# Patient Record
Sex: FEMALE | Race: BLACK OR AFRICAN AMERICAN | NOT HISPANIC OR LATINO | URBAN - METROPOLITAN AREA
[De-identification: names, ages, dates, MRNs, and addresses within clinical notes are randomized per-mention and may not be internally consistent; named-entity substitution may affect disease eponyms.]

---

## 2024-08-23 ENCOUNTER — EMERGENCY (EMERGENCY)
Facility: HOSPITAL | Age: 29
LOS: 0 days | Discharge: HOME | End: 2024-08-23
Attending: STUDENT IN AN ORGANIZED HEALTH CARE EDUCATION/TRAINING PROGRAM
Payer: SELF-PAY

## 2024-08-23 VITALS
TEMPERATURE: 98 F | HEIGHT: 70 IN | WEIGHT: 210.1 LBS | HEART RATE: 97 BPM | SYSTOLIC BLOOD PRESSURE: 119 MMHG | OXYGEN SATURATION: 100 % | RESPIRATION RATE: 18 BRPM | DIASTOLIC BLOOD PRESSURE: 86 MMHG

## 2024-08-23 DIAGNOSIS — J02.9 ACUTE PHARYNGITIS, UNSPECIFIED: ICD-10-CM

## 2024-08-23 DIAGNOSIS — R49.0 DYSPHONIA: ICD-10-CM

## 2024-08-23 DIAGNOSIS — Z20.822 CONTACT WITH AND (SUSPECTED) EXPOSURE TO COVID-19: ICD-10-CM

## 2024-08-23 DIAGNOSIS — Z88.0 ALLERGY STATUS TO PENICILLIN: ICD-10-CM

## 2024-08-23 LAB
FLUAV AG NPH QL: SIGNIFICANT CHANGE UP
FLUBV AG NPH QL: SIGNIFICANT CHANGE UP
SARS-COV-2 RNA SPEC QL NAA+PROBE: SIGNIFICANT CHANGE UP

## 2024-08-23 PROCEDURE — 99284 EMERGENCY DEPT VISIT MOD MDM: CPT

## 2024-08-23 RX ORDER — IBUPROFEN 200 MG
1 TABLET ORAL
Qty: 15 | Refills: 0
Start: 2024-08-23 | End: 2024-08-27

## 2024-08-23 RX ORDER — DEXAMETHASONE 1.5 MG/1
6 TABLET ORAL ONCE
Refills: 0 | Status: COMPLETED | OUTPATIENT
Start: 2024-08-23 | End: 2024-08-23

## 2024-08-23 RX ORDER — CLINDAMYCIN PHOSPHATE 150 MG/ML
1 VIAL (ML) INJECTION
Qty: 30 | Refills: 0
Start: 2024-08-23 | End: 2024-09-01

## 2024-08-23 RX ADMIN — DEXAMETHASONE 6 MILLIGRAM(S): 1.5 TABLET ORAL at 11:09

## 2024-08-23 NOTE — ED ADULT NURSE NOTE - OBJECTIVE STATEMENT
Patient A+Ox4 presents with sore throat and difficulty swallowing for 2 days. Patient denies any fevers, chills, n/v/d, redness and stone noted in L tonsil. Patient c/o of difficulty swallowing and eating.

## 2024-08-23 NOTE — ED PROVIDER NOTE - PATIENT PORTAL LINK FT
You can access the FollowMyHealth Patient Portal offered by [facility] by registering at the following website: [email or web address]. By joining [facility]’s FollowMyHealth portal, you will also be able to view your health information using other applications (apps) compatible with our system.

## 2024-08-23 NOTE — ED PROVIDER NOTE - CARE PROVIDER_API CALL
Sam Peñaloza  Otolaryngology  [address] [place] [address], [place] [address]  Phone: [phone]  Fax: [phone]  Follow Up Time:

## 2024-08-23 NOTE — ED ADULT TRIAGE NOTE - CHIEF COMPLAINT QUOTE
pt c/o sore throat, difficulty swallowing and right side facial pain for 2 days. denies fever. history of asthma.

## 2024-08-23 NOTE — ED PROVIDER NOTE - NSFOLLOWUPINSTRUCTIONS_ED_ALL_ED_FT
You were seen today for sore throat - we sent covid and flu and strep swab  We sent antibiotics for presumed strep throat - clindamycin 300mg three times a day for 10 days - follow up on your chart to see if strep results are positive  We gave you a dose of decadron 6mg to decrease inflammation     For continued pain control-     Take ibuprofen 600 mg every 8 hours as needed for pain with food and plenty of water for up to 5 days  Take tylenol 650 mg every 6 hours as needed for pain, max daily dose 3250 mg/day.     Follow up with your primary doctor in 1-2 days    Return to the emergency department for difficulty swallowing, drooling, high fevers, weakness, shortness of breath

## 2024-08-23 NOTE — ED ADULT NURSE NOTE - RECENT EXPOSURE TO
none known Alert and oriented to person, place and time, memory intact, behavior appropriate to situation, PERRL.

## 2024-08-23 NOTE — ED PROVIDER NOTE - PROVIDER TOKENS
Order noted for CT of head, pt had refused previously, msg sent to [facility] via [employer] serve. Ok to d/c per [facility]. PROVIDER:[TOKEN:[[id number]]]

## 2024-08-23 NOTE — ED PROVIDER NOTE - CLINICAL SUMMARY MEDICAL DECISION MAKING FREE TEXT BOX
29F  no pertinent pmhx, denies pregnancy LMP 2 wks ago, pw sore throat x 2 days, left neck lymph node swelling improved today, pain with swallowing , with chills  but no fever, and pain with swallowing. Took cepacol and tylenol/ibuprofen today without relief. No ear pain  TMs clear, + pharyngeal erythema , no signs of airway obstruction  supportive care, dose of steroids for symptom improvement  clindamycin rx for empiric tx of strep throat pending culture  possible viral pharyngitis  return precautions discussed  d/w pt some meds not recommended during pregnancy, pt states not pregnant and declined pregnancy test

## 2024-08-23 NOTE — ED PROVIDER NOTE - PHYSICAL EXAMINATION
Gen: aox3, nad,   Head: NCAT  ENT: Airway patent, moist mucous membranes, nasal passageways clear, + moderate pharyngeal erythema with no exudates, uvula midline, + mild anterior cervical lymphadenopathy, voice clear, no trismus, no peritonsillar abscess   Cardiac: Normal rate, normal rhythm   Respiratory: Lungs CTA B/L  Gastrointestinal: Abdomen soft, nontender, nondistended, no rebound, no guarding  MSK: No gross abnormalities, FROM of all four extremities, no edema  HEME: Extremities warm and well perfused  Skin: No rashes, no lesions  Neuro: No gross neurologic deficits, normal speech, normal gait

## 2024-08-23 NOTE — ED ADULT NURSE NOTE - NSFALLUNIVINTERV_ED_ALL_ED
Bed/Stretcher in lowest position, wheels locked, appropriate side rails in place/Call bell, personal items and telephone in reach/Instruct patient to call for assistance before getting out of bed/chair/stretcher/Non-slip footwear applied when patient is off stretcher/[place] to call system/Physically safe environment - no spills, clutter or unnecessary equipment/Purposeful proactive rounding/Room/bathroom lighting operational, light cord in reach

## 2024-08-24 LAB — S PYO DNA THROAT QL NAA+PROBE: SIGNIFICANT CHANGE UP
